# Patient Record
Sex: FEMALE | Race: WHITE | Employment: OTHER | URBAN - METROPOLITAN AREA
[De-identification: names, ages, dates, MRNs, and addresses within clinical notes are randomized per-mention and may not be internally consistent; named-entity substitution may affect disease eponyms.]

---

## 2022-05-21 ENCOUNTER — APPOINTMENT (OUTPATIENT)
Dept: GENERAL RADIOLOGY | Age: 57
End: 2022-05-21
Payer: COMMERCIAL

## 2022-05-21 ENCOUNTER — HOSPITAL ENCOUNTER (EMERGENCY)
Age: 57
Discharge: HOME | End: 2022-05-21
Attending: STUDENT IN AN ORGANIZED HEALTH CARE EDUCATION/TRAINING PROGRAM
Payer: COMMERCIAL

## 2022-05-21 VITALS
TEMPERATURE: 98.7 F | RESPIRATION RATE: 16 BRPM | OXYGEN SATURATION: 96 % | BODY MASS INDEX: 21.34 KG/M2 | SYSTOLIC BLOOD PRESSURE: 121 MMHG | DIASTOLIC BLOOD PRESSURE: 77 MMHG | HEART RATE: 77 BPM | WEIGHT: 125 LBS | HEIGHT: 64 IN

## 2022-05-21 DIAGNOSIS — S42.034A CLOSED NONDISPLACED FRACTURE OF ACROMIAL END OF RIGHT CLAVICLE, INITIAL ENCOUNTER: Primary | ICD-10-CM

## 2022-05-21 PROCEDURE — 6360000002 HC RX W HCPCS: Performed by: STUDENT IN AN ORGANIZED HEALTH CARE EDUCATION/TRAINING PROGRAM

## 2022-05-21 PROCEDURE — 96372 THER/PROPH/DIAG INJ SC/IM: CPT

## 2022-05-21 PROCEDURE — 73000 X-RAY EXAM OF COLLAR BONE: CPT

## 2022-05-21 PROCEDURE — 99284 EMERGENCY DEPT VISIT MOD MDM: CPT

## 2022-05-21 PROCEDURE — 73030 X-RAY EXAM OF SHOULDER: CPT

## 2022-05-21 RX ORDER — AMLODIPINE BESYLATE 5 MG/1
5 TABLET ORAL DAILY
COMMUNITY
Start: 2022-04-01

## 2022-05-21 RX ORDER — OXYCODONE HYDROCHLORIDE 5 MG/1
5 TABLET ORAL EVERY 6 HOURS PRN
Qty: 10 TABLET | Refills: 0 | Status: SHIPPED | OUTPATIENT
Start: 2022-05-21 | End: 2022-05-24

## 2022-05-21 RX ADMIN — HYDROMORPHONE HYDROCHLORIDE 1 MG: 1 INJECTION, SOLUTION INTRAMUSCULAR; INTRAVENOUS; SUBCUTANEOUS at 12:10

## 2022-05-21 ASSESSMENT — PAIN SCALES - GENERAL
PAINLEVEL_OUTOF10: 4
PAINLEVEL_OUTOF10: 9

## 2022-05-21 ASSESSMENT — PAIN DESCRIPTION - LOCATION
LOCATION: SHOULDER
LOCATION: SHOULDER

## 2022-05-21 ASSESSMENT — PAIN - FUNCTIONAL ASSESSMENT
PAIN_FUNCTIONAL_ASSESSMENT: PREVENTS OR INTERFERES WITH MANY ACTIVE NOT PASSIVE ACTIVITIES
PAIN_FUNCTIONAL_ASSESSMENT: 0-10
PAIN_FUNCTIONAL_ASSESSMENT: 0-10

## 2022-05-21 ASSESSMENT — PAIN DESCRIPTION - DESCRIPTORS: DESCRIPTORS: THROBBING

## 2022-05-21 ASSESSMENT — PAIN DESCRIPTION - PAIN TYPE: TYPE: ACUTE PAIN

## 2022-05-21 ASSESSMENT — PAIN DESCRIPTION - FREQUENCY: FREQUENCY: CONTINUOUS

## 2022-05-21 ASSESSMENT — PAIN DESCRIPTION - ONSET: ONSET: SUDDEN

## 2022-05-21 ASSESSMENT — PAIN DESCRIPTION - ORIENTATION
ORIENTATION: RIGHT
ORIENTATION: RIGHT

## 2022-05-21 NOTE — ED PROVIDER NOTES
[address] ENCOUNTER          EM RESIDENT NOTE       Date of evaluation: 5/21/2022    Chief Complaint     Shoulder Pain (patient got slammed into wall by horse, pain and bruising to right shoulder)      History of Present Illness     Harl Essex is a 65 y.o. female who presents with right shoulder pain. Patient states she was slammed into the wall by her horse and had immediate pain over the right medial aspect of her shoulder. No prior history of injuries to that arm, right-handed. She denies any numbness or weakness distally or other injuries. Did not take anything at home prior to arrival    Other than stated above, no additional aggravating or alleviating factors are noted. Review of Systems     Positive for shoulder pain, arm pain. Negative for numbness, weakness, other injury, head trauma, loss of consciousness. All other systems reviewed and are negative except as mentioned in HPI. Past Medical, Surgical, Family, and Social History     She has a past medical history of Hypertension and Hypothyroidism. She has no past surgical history on file. Her family history is not on file. She reports that she has never smoked. She has never used smokeless tobacco. She reports current alcohol use of about 1.0 standard drink of alcohol per week. She reports that she does not use drugs. Medications     Previous Medications    AMLODIPINE (NORVASC) 5 MG TABLET    Take 5 mg by mouth daily    FAMOTIDINE (PEPCID) 20 MG TABLET    Take 1 tablet by mouth 2 times daily for 14 days. HYDROCHLOROTHIAZIDE (HYDRODIURIL) 25 MG TABLET    Take 25 mg by mouth daily. LEVOTHYROXINE SODIUM 125 MCG CAPS    Take  by mouth Daily. OMEPRAZOLE (PRILOSEC) 10 MG CAPSULE    Take 10 mg by mouth daily. Allergies     She has No Known Allergies. Physical Exam     INITIAL VITALS: BP: (!) 123/95, Temp: 98.7 °F (37.1 °C), Pulse: 84, Resp: 18, SpO2: 100 %   General:  Well appearing. maintained. Humeral head is well-seated within the glenoid fossa. No soft tissue abnormality seen. IMPRESSION:      1. Nondisplaced fracture through the distal right clavicle with mild distraction of the fracture fragments. LABS:   No results found for this visit on 05/21/22. RECENT VITALS:  BP: (!) 129/96, Temp: 98.7 °F (37.1 °C), Pulse: 84,Resp: 18, SpO2: 100 %     Procedures     ED Course     Nursing Notes, Past Medical Hx, Past Surgical Hx, Social Hx, Allergies, and Family Hx were reviewed. The patient was given the followingmedications:  Orders Placed This Encounter   Medications    HYDROmorphone (DILAUDID) injection 1 mg    oxyCODONE (ROXICODONE) 5 MG immediate release tablet     Sig: Take 1 tablet by mouth every 6 hours as needed for Pain for up to 3 days. WARNING:  May cause drowsiness. May impair ability to operate vehicles or machinery. Do not use in combination with alcohol. Dispense:  10 tablet     Refill:  0       CONSULTS:  None    MEDICAL DECISION MAKING / ASSESSMENT / Marcos Justen is a 65 y.o. female with a history and presentation as described above in HPI. The patient was evaluated by myself and the ED Attending Physician, Dr. Mor Juarez. All management and disposition plans were discussed and agreed upon. Upon presentation, the patient was well appearing and had stable vitals. X-rays were obtained of the right clavicle and right shoulder that demonstrated a distal shaft fracture without significant displacement. Additionally, as reported above there is no tenting of the skin and the patient was neuro vastly intact. Given the location of the injury we will plan to sling and give orthopedics follow-up. She was given a dose of IM Dilaudid in the emergency department due to the significance of her pain and will be sent with a short course of oxycodone and recommended use of Tylenol and ibuprofen.     While x-ray, I was informed the patient had a syncopal episode. She states this was due to the pain and manipulation she had to do with her arm in order to perform the x-rays. While she did hit her head she did not have any loss of consciousness and is not endorsing pain in anywhere but her initial clavicular pain so I do not think that further work-up is necessary nor any cross-sectional imaging of her head per [place] head CT rules. Patient's pain improved with IM Dilaudid. Medications received during this ED visit:    Medications   HYDROmorphone (DILAUDID) injection 1 mg (1 mg IntraMUSCular Given 5/21/22 1210)       Clinical Impression     1. Closed nondisplaced fracture of acromial end of right clavicle, initial encounter        Disposition     PATIENT REFERRED TO:  Joseph Saldaña MD  [address] [address] [facility]    Call         DISCHARGE MEDICATIONS:  New Prescriptions    OXYCODONE (ROXICODONE) 5 MG IMMEDIATE RELEASE TABLET    Take 1 tablet by mouth every 6 hours as needed for Pain for up to 3 days. WARNING:  May cause drowsiness. May impair ability to operate vehicles or machinery. Do not use in combination with alcohol. DISPOSITION Decision To Discharge 05/21/2022 01:02:25 PM  Discharge: At this time, the patient was deemed appropriate for discharge. Workup, treatment and diagnosis were discussed with the patient and/or family members; the patient agrees to the plan and all questions were addressed and answered. My customary discharge instructions, including strict return precautions for new or worsening symptoms or any concern she believes warrants acute physician evaluation, were provided.  she was subsequently sent home in stable/improved condition       Esme Hernandez MD  Resident  05/21/22 [id number]

## 2022-05-21 NOTE — ED PROVIDER NOTES
ED Attending Attestation Note     Date of evaluation: 5/21/2022    This patient was seen by the resident. I have seen and examined the patient, agree with the workup, evaluation, management and diagnosis. The care plan has been discussed. My assessment reveals pleasant female who developed right anterior shoulder pain at the area of her right distal clavicle after injury involving a horse. She denies any other significant injuries. Sensation to soft touch throughout right upper extremity is intact. Strong right radial pulse. Able to range distal joints without issue. There is tenderness focally at the right distal clavicle along with overlying bruising. There is no tenting of the skin noted.      Jazmin Julien MD  05/21/22 [id number]

## 2022-05-21 NOTE — ED NOTES
Patient prepared for and ready to be discharged. Patient discharged at this time to home in care of self in no acute distress after verbalizing understanding of discharge instructions. Patient left after receiving After Visit Summary instructions.         Reshma Moseley RN  05/21/22 7179